# Patient Record
Sex: FEMALE | Race: WHITE | NOT HISPANIC OR LATINO | Employment: OTHER | URBAN - METROPOLITAN AREA
[De-identification: names, ages, dates, MRNs, and addresses within clinical notes are randomized per-mention and may not be internally consistent; named-entity substitution may affect disease eponyms.]

---

## 2017-03-01 ENCOUNTER — OFFICE VISIT (OUTPATIENT)
Dept: ORTHOPEDIC SURGERY | Facility: CLINIC | Age: 62
End: 2017-03-01

## 2017-03-01 VITALS — WEIGHT: 293 LBS | BODY MASS INDEX: 48.82 KG/M2 | HEIGHT: 65 IN

## 2017-03-01 DIAGNOSIS — M17.11 PRIMARY OSTEOARTHRITIS OF RIGHT KNEE: ICD-10-CM

## 2017-03-01 DIAGNOSIS — E66.9 OBESITY, UNSPECIFIED OBESITY SEVERITY, UNSPECIFIED OBESITY TYPE: ICD-10-CM

## 2017-03-01 DIAGNOSIS — M25.561 RIGHT KNEE PAIN, UNSPECIFIED CHRONICITY: Primary | ICD-10-CM

## 2017-03-01 PROCEDURE — 73560 X-RAY EXAM OF KNEE 1 OR 2: CPT | Performed by: ORTHOPAEDIC SURGERY

## 2017-03-01 PROCEDURE — 99213 OFFICE O/P EST LOW 20 MIN: CPT | Performed by: ORTHOPAEDIC SURGERY

## 2017-03-01 RX ORDER — MELOXICAM 7.5 MG/1
TABLET ORAL
Qty: 30 TABLET | Refills: 1 | Status: SHIPPED | OUTPATIENT
Start: 2017-03-01 | End: 2017-09-08 | Stop reason: SDUPTHER

## 2017-03-01 RX ORDER — BUMETANIDE 1 MG/1
1 TABLET ORAL DAILY
COMMUNITY
Start: 2017-02-17

## 2017-03-01 RX ORDER — PRAVASTATIN SODIUM 40 MG
40 TABLET ORAL NIGHTLY
COMMUNITY
Start: 2017-02-17

## 2017-03-01 NOTE — PROGRESS NOTES
Chief Complaint   Patient presents with   • Right Knee - Follow-up, Pain           HPI  Patient reports that she has pain from thigh to shin on right leg.  Patient is doing quite well from the prepatellar bursal infection which has resolved following an I&D that I had performed.  She now has medial sided joint line pain and tenderness.  There radiates down to the shin and makes it difficult for her to walk.  She has difficulty in walking on uneven surfaces.  Difficulty in going up and down the steps.  The knee pain is affecting her quality of life negatively.  She states that the knee pain is burning in character.  There is associated swelling of the knee which causes of tightness in flexion.        There were no vitals filed for this visit.        Review of Systems   Constitutional: Negative for chills, fever and unexpected weight change.   HENT: Negative for trouble swallowing and voice change.    Eyes: Negative for visual disturbance.   Respiratory: Negative for cough and shortness of breath.    Cardiovascular: Negative for chest pain and leg swelling.   Gastrointestinal: Negative for abdominal pain, nausea and vomiting.   Endocrine: Negative for cold intolerance and heat intolerance.   Genitourinary: Negative for difficulty urinating, frequency and urgency.   Musculoskeletal: Positive for arthralgias, joint swelling and myalgias.   Skin: Negative for rash and wound.   Allergic/Immunologic: Negative for immunocompromised state.   Neurological: Negative for weakness and numbness.   Hematological: Does not bruise/bleed easily.   Psychiatric/Behavioral: Negative for dysphoric mood. The patient is not nervous/anxious.            Physical Exam   Constitutional: She appears well-developed.   HENT:   Head: Normocephalic.   Eyes: Pupils are equal, round, and reactive to light.   Neck: Normal range of motion.   Cardiovascular: Normal rate and intact distal pulses.    Pulmonary/Chest: Effort normal and breath sounds  normal.   Abdominal: Soft. Bowel sounds are normal.   Musculoskeletal: Normal range of motion.   Neurological: She is alert. She has normal reflexes.   Skin: Skin is warm and dry.   Psychiatric: She has a normal mood and affect. Her behavior is normal. Judgment and thought content normal.           Joint/Body Part Specific Exam:  right knee. Patient has crepitus throughout range of motion. Positive patellar grind test. Mild effusion. Lachman is negative. Pivot shift is negative. Anterior and posterior drawer signs are negative. Significant joint line tenderness is noted on the medial aspect of the knee. Patient has a varus orientation of the knee. Range of motion in flexion is for 0- 100° degrees. Neurovascular status intact.  Dorsalis pedis and posterior tibial artery pulses are palpable. Common peroneal nerve function is well preserved. Patients gait is cautious and antalgic. Skin and soft tissues are swollen; consistent with synovitis and effusion      X-RAY Report:  right Knee X-Ray  Indication: Pain on the medial aspect the knee  AP, Lateral, and [place] views  Findings: Advanced degenerative osteoarthritis with complete loss of medial joint space  no bony lesion  Soft tissues within normal limits  decreased joint spaces  Hardware appropriately positioned not applicable      yes prior studies available for comparison.    X-RAY was ordered and reviewed by Wong Arrieta MD      Diagnostics:        Letha was seen today for follow-up and pain.    Diagnoses and all orders for this visit:    Right knee pain, unspecified chronicity  -     XR Knee 1 or 2 View Right            Procedures        Plan:  Gentle exercise program including stretching of the quads and the hamstrings.    Patient needs to aggressively lose weight because her high BMI is affecting knee function negatively.    Tablet Mobic 7.5 mg tab 1 by mouth daily for pain swelling and inflammation.    Uses supportive brace to the knee.    Calcium and vitamin  D for bone health.    Steroid injection with intra-articular application discussed with the patient but at this point she would like to defer that option.    Eventually she is going to need a total knee arthroplasty but I would like to wait for surgical intervention as long as possible.    Follow-up in 6 months.

## 2017-07-19 ENCOUNTER — TELEPHONE (OUTPATIENT)
Dept: ORTHOPEDIC SURGERY | Facility: CLINIC | Age: 62
End: 2017-07-19

## 2017-07-19 NOTE — TELEPHONE ENCOUNTER
PATIENT CALLED AND IS ASKING IF DR. BAINS WILL PRESCRIBE SOMETHING FOR PAIN.  PATIENT STATED THAT HER RIGHT KNEE IS REALLY PAINFUL WHERE THE KNOT IS ON HER KNEE.  PATIENT DESCRIBES THE KNOT TO BE ABOUT THE SIZE OF A BOILED EGG CUT LENGTHWISE.  PATIENT BELIEVES THE KNOT TO BE SCAR TISSUE.  SHE HAS TRIED ADVIL AND THAT ONLY WORKED FOR A LITTLE WHILE.  I HAVE SCHEDULED PATIENT AN APPOINTMENT FOR 8/02/17 TO SEE DR. BAINS.  PATIENT IS S/P RIGHT KNEE I & D ON 8/12/16.  PATIENT IS ALLERGIC TO ERYTHROMYCIN.  PLEASE ADVISE

## 2017-07-20 NOTE — TELEPHONE ENCOUNTER
Called into pharmacy Tramadol 50mg #30 one tablet every 12hrs. Prn pain no refills per [facility]. I called the patient to let her know/rj.

## 2017-08-02 ENCOUNTER — OFFICE VISIT (OUTPATIENT)
Dept: ORTHOPEDIC SURGERY | Facility: CLINIC | Age: 62
End: 2017-08-02

## 2017-08-02 VITALS — TEMPERATURE: 98.7 F | HEIGHT: 65 IN | WEIGHT: 293 LBS | BODY MASS INDEX: 48.82 KG/M2

## 2017-08-02 DIAGNOSIS — M17.11 PRIMARY OSTEOARTHRITIS OF RIGHT KNEE: Primary | ICD-10-CM

## 2017-08-02 PROCEDURE — 99213 OFFICE O/P EST LOW 20 MIN: CPT | Performed by: ORTHOPAEDIC SURGERY

## 2017-08-02 PROCEDURE — 20610 DRAIN/INJ JOINT/BURSA W/O US: CPT | Performed by: ORTHOPAEDIC SURGERY

## 2017-08-02 RX ORDER — TRAMADOL HYDROCHLORIDE 50 MG/1
TABLET ORAL
Refills: 0 | COMMUNITY
Start: 2017-07-20

## 2017-08-02 RX ORDER — VENLAFAXINE HYDROCHLORIDE 37.5 MG/1
CAPSULE, EXTENDED RELEASE ORAL
COMMUNITY
Start: 2017-07-06 | End: 2017-08-02

## 2017-08-02 RX ORDER — VENLAFAXINE HYDROCHLORIDE 75 MG/1
75 CAPSULE, EXTENDED RELEASE ORAL DAILY
COMMUNITY
Start: 2017-07-06 | End: 2019-09-26

## 2017-08-02 RX ORDER — EMPAGLIFLOZIN 10 MG/1
TABLET, FILM COATED ORAL
Refills: 2 | COMMUNITY
Start: 2017-07-18

## 2017-08-02 RX ORDER — INSULIN GLARGINE 100 [IU]/ML
INJECTION, SOLUTION SUBCUTANEOUS
Refills: 2 | COMMUNITY
Start: 2017-07-24

## 2017-08-02 RX ORDER — ERGOCALCIFEROL 1.25 MG/1
50000 CAPSULE ORAL
Refills: 2 | COMMUNITY
Start: 2017-06-14

## 2017-08-02 RX ADMIN — METHYLPREDNISOLONE ACETATE 160 MG: 80 INJECTION, SUSPENSION INTRA-ARTICULAR; INTRALESIONAL; INTRAMUSCULAR; SOFT TISSUE at 16:19

## 2017-08-02 RX ADMIN — LIDOCAINE HYDROCHLORIDE 2 ML: 10 INJECTION, SOLUTION INFILTRATION; PERINEURAL at 16:19

## 2017-08-02 NOTE — PROGRESS NOTES
Chief Complaint   Patient presents with   • Right Knee - Follow-up, Pain           HPI patient here for follow up on right kneePain and discomfort.  The pain is on the medial aspect the knee.  His symptoms are worse and she fell [facility].  She did develop a prepatellar hematoma which had to be eventually evacuated the surgical intervention.  The knee tends to buckle and give out from underneath her.  She has difficulty in going up and down the steps.  Cross body activities bother the patient significantly.  She finds it very difficult to walk on an inclined surface.  She does get recurrent effusions and when the knee is swollen she has a very difficult time with squatting on the ground.  Occasionally the knee will buckle when she is not ready for it and she is concerned that she'll fall and hurt either the upper extremities or her hip or low back.  Patient does understand that she is very significantly obese and needs to work hard to reduce her body weight so that she can get an optimal outcome with conservative versus operative care of the knee.        Vitals:    08/02/17 1541   Temp: 98.7 °F (37.1 °C)           Review of Systems   Constitutional: Negative.    HENT: Negative.    Eyes: Negative.    Respiratory: Negative.    Cardiovascular: Negative.    Gastrointestinal: Negative.    Endocrine: Negative.    Genitourinary: Negative.    Musculoskeletal: Negative.    Skin: Negative.    Allergic/Immunologic: Negative.    Neurological: Negative.    Hematological: Negative.    Psychiatric/Behavioral: Negative.            Physical Exam   Constitutional: She is oriented to person, place, and time. She appears well-nourished.   HENT:   Head: Atraumatic.   Eyes: EOM are normal.   Neck: Neck supple.   Cardiovascular: Normal rate and intact distal pulses.    Pulmonary/Chest: Effort normal and breath sounds normal.   Abdominal: Soft. Bowel sounds are normal.   Musculoskeletal: She exhibits edema, tenderness and  deformity.   Neurological: She is alert and oriented to person, place, and time. She has normal reflexes.   Skin: Skin is dry.   Psychiatric: She has a normal mood and affect. Her behavior is normal. Judgment and thought content normal.   Nursing note and vitals reviewed.          Joint/Body Part Specific Exam:  right knee. Patient has crepitus throughout range of motion. Positive patellar grind test. Mild effusion. Lachman is negative. Pivot shift is negative. Anterior and posterior drawer signs are negative. Significant joint line tenderness is noted on the medial aspect of the knee. Patient has a varus orientation of the knee. Range of motion in flexion is for 0- 110° degrees. Neurovascular status intact.  Dorsalis pedis and posterior tibial artery pulses are palpable. Common peroneal nerve function is well preserved. Patients gait is cautious and antalgic. Skin and soft tissues are swollen; consistent with synovitis and effusion      X-RAY Report:  Previously obtained x-rays are reviewed and there is some narrowing of the medial joint space      Diagnostics:        Letha was seen today for follow-up and pain.    Diagnoses and all orders for this visit:    Primary osteoarthritis of right knee  -     Large Joint Arthrocentesis          Large Joint Arthrocentesis  Date/Time: 8/2/2017 4:19 PM  Consent given by: patient  Site marked: site marked  Timeout: Immediately prior to procedure a time out was called to verify the correct patient, procedure, equipment, support staff and site/side marked as required   Supporting Documentation  Indications: pain   Procedure Details  Location: knee - R knee  Preparation: Patient was prepped and draped in the usual sterile fashion  Needle size: 25 G  Approach: anteromedial  Medications administered: 2 mL lidocaine 1 %; 160 mg methylPREDNISolone acetate 80 MG/ML  Patient tolerance: patient tolerated the procedure well with no immediate complications              Plan:  Injected  patient's right knee with a steroid from an anteromedial approach.    Use a supportive brace to the knee.    Calcium and vitamin D for bone health.    Tablet ibuprofen 600 mg orally twice a day when necessary pain and discomfort.    Eventually this patient will need a total knee arthroplasty but she has many medical issues including poorly controlled diabetes and therefore I would recommend conservative, nonoperative care for as long as possible.    Follow-up in my office in 3 months.    Intra-articular Synvisc Visco supplementation discussed with the patient and she understands and accepts that.  Her graft patient weighs 299 pounds by her own admission and at this point her BMI is extremely high.  She needs to work aggressively with a dietitian as well as with a bariatric surgeon to help decrease her BMI and to minimize her mechanical symptoms from overloading of the lower extremities because of her obesity.  After for a surgical candidate but minimal risks she needs to be in the vicinity of 200 pounds rather than the current almost 300 pounds.

## 2017-08-10 RX ORDER — METHYLPREDNISOLONE ACETATE 80 MG/ML
160 INJECTION, SUSPENSION INTRA-ARTICULAR; INTRALESIONAL; INTRAMUSCULAR; SOFT TISSUE
Status: COMPLETED | OUTPATIENT
Start: 2017-08-02 | End: 2017-08-02

## 2017-08-10 RX ORDER — LIDOCAINE HYDROCHLORIDE 10 MG/ML
2 INJECTION, SOLUTION INFILTRATION; PERINEURAL
Status: COMPLETED | OUTPATIENT
Start: 2017-08-02 | End: 2017-08-02

## 2017-09-05 ENCOUNTER — TELEPHONE (OUTPATIENT)
Dept: ORTHOPEDIC SURGERY | Facility: CLINIC | Age: 62
End: 2017-09-05

## 2017-09-05 NOTE — TELEPHONE ENCOUNTER
PATIENT CALLED AND STATED THAT TRAMADOL ISN'T HELPING WITH THE PAIN IN HER KNEE.  IS THERE ANYTHING ELSE THAT DR. BAINS CAN PRESCRIBE?

## 2017-09-05 NOTE — TELEPHONE ENCOUNTER
Patient will have to call her PCP or get attached to a pain clinic if she is wanting to be prescribed a narcotic medication for her knee pain.

## 2017-09-08 RX ORDER — MELOXICAM 7.5 MG/1
TABLET ORAL
Qty: 30 TABLET | Refills: 1 | Status: SHIPPED | OUTPATIENT
Start: 2017-09-08

## 2017-11-02 ENCOUNTER — CLINICAL SUPPORT (OUTPATIENT)
Dept: ORTHOPEDIC SURGERY | Facility: CLINIC | Age: 62
End: 2017-11-02

## 2017-11-02 ENCOUNTER — OFFICE VISIT (OUTPATIENT)
Dept: ORTHOPEDIC SURGERY | Facility: CLINIC | Age: 62
End: 2017-11-02

## 2017-11-02 DIAGNOSIS — M17.12 PRIMARY OSTEOARTHRITIS OF LEFT KNEE: ICD-10-CM

## 2017-11-02 DIAGNOSIS — M70.62 GREATER TROCHANTERIC BURSITIS OF LEFT HIP: ICD-10-CM

## 2017-11-02 DIAGNOSIS — IMO0001 CLASS 3 OBESITY WITHOUT SERIOUS COMORBIDITY WITH BODY MASS INDEX (BMI) OF 45.0 TO 49.9 IN ADULT, UNSPECIFIED OBESITY TYPE: ICD-10-CM

## 2017-11-02 DIAGNOSIS — M70.42 PREPATELLAR BURSITIS OF LEFT KNEE: ICD-10-CM

## 2017-11-02 DIAGNOSIS — M17.11 PRIMARY OSTEOARTHRITIS OF RIGHT KNEE: ICD-10-CM

## 2017-11-02 DIAGNOSIS — M17.11 PRIMARY OSTEOARTHRITIS OF RIGHT KNEE: Primary | ICD-10-CM

## 2017-11-02 DIAGNOSIS — M17.12 PRIMARY OSTEOARTHRITIS OF LEFT KNEE: Primary | ICD-10-CM

## 2017-11-02 PROCEDURE — 20610 DRAIN/INJ JOINT/BURSA W/O US: CPT | Performed by: ORTHOPAEDIC SURGERY

## 2017-11-02 PROCEDURE — 99213 OFFICE O/P EST LOW 20 MIN: CPT | Performed by: ORTHOPAEDIC SURGERY

## 2017-11-02 PROCEDURE — 73502 X-RAY EXAM HIP UNI 2-3 VIEWS: CPT | Performed by: ORTHOPAEDIC SURGERY

## 2017-11-02 RX ADMIN — LIDOCAINE HYDROCHLORIDE 2 ML: 10 INJECTION, SOLUTION INFILTRATION; PERINEURAL at 10:30

## 2017-11-02 RX ADMIN — METHYLPREDNISOLONE ACETATE 160 MG: 80 INJECTION, SUSPENSION INTRA-ARTICULAR; INTRALESIONAL; INTRAMUSCULAR; SOFT TISSUE at 10:30

## 2017-11-02 NOTE — PROGRESS NOTES
Letha Almonte  ?  1955  ?  Chief Complaint   Patient presents with   • Right Knee - Follow-up     ?  HPI the patient is here today for a follow up of her right knee. She has pain and discomfort medially. Occasionally the knee will buckle and give out from underneath her.  She has difficulty going up and down the steps.  Walking on inclined surfaces bothers the patient significantly.  I have counseled her to wait for surgical intervention in the form of a total knee arthroplasty for as long as possible because she is relatively young and her radiological findings are not as advanced .  She also had an infected prepatellar bursitis last year and I am trying to maximize the time between the infective pathology and an elective knee replacement     Physical Exam   Constitutional: She is oriented to person, place, and time. She appears well-nourished.   HENT:   Head: Atraumatic.   Eyes: EOM are normal.   Neck: Neck supple.   Cardiovascular: Normal rate and intact distal pulses.    Pulmonary/Chest: Effort normal and breath sounds normal.   Abdominal: Bowel sounds are normal.   Musculoskeletal: She exhibits edema and tenderness.   Neurological: She is alert and oriented to person, place, and time. She has normal reflexes.   Skin: Skin is dry.   Psychiatric: She has a normal mood and affect. Her behavior is normal. Judgment and thought content normal.   Nursing note and vitals reviewed.      Review of Systems   Constitutional: Negative.    HENT: Negative.    Eyes: Negative.    Respiratory: Negative.    Cardiovascular: Negative.    Gastrointestinal: Negative.    Endocrine: Negative.    Genitourinary: Negative.    Musculoskeletal: Positive for back pain, gait problem and joint swelling.   Skin: Negative.    Allergic/Immunologic: Negative.    Hematological: Negative.    Psychiatric/Behavioral: Negative.        Joint/Body Part Specific Exam:   right knee. Patient has crepitus throughout range of motion. Positive patellar grind  test. Mild effusion. Lachman is negative. Pivot shift is negative. Anterior and posterior drawer signs are negative. Significant joint line tenderness is noted on the medial aspect of the knee. Patient has a varus orientation of the knee. Range of motion in flexion is for 0- 100 degrees. Neurovascular status intact.  Dorsalis pedis and posterior tibial artery pulses are palpable. Common peroneal nerve function is well preserved. Patients gait is cautious and antalgic. Skin and soft tissues are swollen; consistent with synovitis and effusion  X-Ray Report:  right Knee X-Ray  Indication: Pain on going up the stairs  AP, Lateral views  Findings: moderately advanced medial compartment disease  no bony lesion  Soft tissues within normal limits  decreased joint spaces  Hardware appropriately positioned not applicable      yes prior studies available for comparison.    X-RAY was ordered and reviewed by Wong Arrieta MD  Diagnostics:    Large Joint Arthrocentesis  Date/Time: 11/2/2017 10:30 AM  Consent given by: patient  Site marked: site marked  Timeout: Immediately prior to procedure a time out was called to verify the correct patient, procedure, equipment, support staff and site/side marked as required   Supporting Documentation  Indications: pain   Procedure Details  Location: knee - R knee  Preparation: Patient was prepped and draped in the usual sterile fashion  Needle size: 25 G  Approach: anteromedial  Medications administered: 2 mL lidocaine 1 %; 160 mg methylPREDNISolone acetate 80 MG/ML  Patient tolerance: patient tolerated the procedure well with no immediate complications        ?  ?  Plan      · Ice pack for 48 hrs     · Injected patients right knee joint(s) with Steroid    · Ace wrap for swelling PRN    · Elevation for swelling PRN    · Tablet Ibuprofen 600 mg P.O. BID x 5 Days with meals    · Call office for any problems  With procedure    · Home Physical Therapy Program discussed with patient    · F/U in 3  months for Re-evaluation

## 2017-11-02 NOTE — PROGRESS NOTES
Chief Complaint   Patient presents with   • Left Hip - Pain             HPI The patient is here today for left hip pain and discomfort. This pain has been ongoing for about 1 month. The pain is over the lateral aspect of the hip.  It is a sharp stabbing pain.  It is based over the greater trochanter.  Cross body activities bother the patient significantly.  She has difficulty in turning over in bed at night.  She does have bilateral knee pain with extension and this makes her hip pain a whole lot worse.  She does not have any risk factors for avascular necrosis.          Allergies   Allergen Reactions   • Erythromycin          Social History     Social History   • Marital status: Single     Spouse name: N/A   • Number of children: N/A   • Years of education: N/A     Occupational History   • Not on file.     Social History Main Topics   • Smoking status: Never Smoker   • Smokeless tobacco: Never Used   • Alcohol use No   • Drug use: No   • Sexual activity: Defer     Other Topics Concern   • Not on file     Social History Narrative       Family History   Problem Relation Age of Onset   • Hypertension Other    • Diabetes Other    • Cancer Other      colon cancer       Past Surgical History:   Procedure Laterality Date   • CHOLECYSTECTOMY     • HEEL SPUR SURGERY     • HERNIA REPAIR      hiatal hernia   • HYSTERECTOMY     • ROTATOR CUFF REPAIR         Past Medical History:   Diagnosis Date   • Anxiety    • Depression    • Diabetes mellitus    • Hypertension    • Kidney stones    • Osteoarthritis    • Sleep apnea            There were no vitals filed for this visit.          Review of Systems   Constitutional: Negative.    HENT: Negative.    Eyes: Negative.    Respiratory: Negative.    Cardiovascular: Negative.    Endocrine: Negative.    Genitourinary: Negative.    Musculoskeletal: Positive for gait problem and joint swelling.   Skin: Negative.    Allergic/Immunologic: Negative.    Hematological: Negative.     Psychiatric/Behavioral: Negative.            Physical Exam   Constitutional: She is oriented to person, place, and time. She appears well-nourished.   HENT:   Head: Atraumatic.   Eyes: EOM are normal.   Neck: Neck supple.   Cardiovascular: Normal rate and intact distal pulses.    Pulmonary/Chest: Breath sounds normal.   Abdominal: Bowel sounds are normal.   Musculoskeletal: She exhibits edema and tenderness. She exhibits no deformity.   Neurological: She is alert and oriented to person, place, and time. She has normal reflexes.   Skin: Skin is dry.   Psychiatric: She has a normal mood and affect. Her behavior is normal. Judgment and thought content normal.   Nursing note and vitals reviewed.              Joint/Body Part Specific Exam:  left Hip. Skin and soft tissues over the greater trochanteric bursa are painful and tender for the patient. Neurovascular status is intact. IT band is painful and tender. Cross body adduction bothers the patient significantly. Internal and external rotations bother the patient significantly with tenderness over the greater trochanter. There is no clinical deformity. No shortening. The patient does have a significant limp because by the trochanteric pain caused by the abductors. The piriformis is tight and with any rotation there is significant capsular tightness. Dorsalis pedis and posterior tibial artery pulses are palpable. Capillary refill is 2 seconds with a brisk return. Common peroneal nerve function is well preserved.          X-RAY Report:  X-ray films of the hip from an outside facility show some narrowing of the lateral joint space.          Diagnostics:            Letha was seen today for pain.    Diagnoses and all orders for this visit:    Primary osteoarthritis of left knee    Primary osteoarthritis of right knee    Class 3 obesity without serious comorbidity with body mass index (BMI) of 45.0 to 49.9 in adult, unspecified obesity type    Greater trochanteric bursitis  of left hip            Procedures          I provided this patient with educational materials regarding bone health.        Plan:   Stretching exercises of the hip including the IT band.    Tablet ibuprofen 600 mg orally twice a day for pain and discomfort.    Patient needs to work hard with a multidisciplinary team to attempt to lose some body weight because her obesity is hurting his lower extremity symptoms are very significantly.    Calcium and vitamin D for bone health.    Steroid injection to the base of the greater trochanter over the bursa are discussed with the patient but she declines at today's visit.    Follow-up in my office in 6 months.          CC To Ty Fernandez MD

## 2017-11-12 RX ORDER — METHYLPREDNISOLONE ACETATE 80 MG/ML
160 INJECTION, SUSPENSION INTRA-ARTICULAR; INTRALESIONAL; INTRAMUSCULAR; SOFT TISSUE
Status: COMPLETED | OUTPATIENT
Start: 2017-11-02 | End: 2017-11-02

## 2017-11-12 RX ORDER — LIDOCAINE HYDROCHLORIDE 10 MG/ML
2 INJECTION, SOLUTION INFILTRATION; PERINEURAL
Status: COMPLETED | OUTPATIENT
Start: 2017-11-02 | End: 2017-11-02

## 2018-02-08 ENCOUNTER — OFFICE VISIT (OUTPATIENT)
Dept: ORTHOPEDIC SURGERY | Facility: CLINIC | Age: 63
End: 2018-02-08

## 2018-02-08 DIAGNOSIS — M17.11 PRIMARY OSTEOARTHRITIS OF RIGHT KNEE: ICD-10-CM

## 2018-02-08 DIAGNOSIS — G89.29 CHRONIC PAIN OF RIGHT KNEE: ICD-10-CM

## 2018-02-08 DIAGNOSIS — M25.561 CHRONIC PAIN OF RIGHT KNEE: ICD-10-CM

## 2018-02-08 DIAGNOSIS — M25.552 LEFT HIP PAIN: Primary | ICD-10-CM

## 2018-02-08 PROCEDURE — 20610 DRAIN/INJ JOINT/BURSA W/O US: CPT | Performed by: ORTHOPAEDIC SURGERY

## 2018-02-08 PROCEDURE — 73502 X-RAY EXAM HIP UNI 2-3 VIEWS: CPT | Performed by: ORTHOPAEDIC SURGERY

## 2018-02-08 PROCEDURE — 99213 OFFICE O/P EST LOW 20 MIN: CPT | Performed by: ORTHOPAEDIC SURGERY

## 2018-02-08 RX ADMIN — METHYLPREDNISOLONE ACETATE 160 MG: 80 INJECTION, SUSPENSION INTRA-ARTICULAR; INTRALESIONAL; INTRAMUSCULAR; SOFT TISSUE at 13:30

## 2018-02-08 RX ADMIN — LIDOCAINE HYDROCHLORIDE 2 ML: 10 INJECTION, SOLUTION EPIDURAL; INFILTRATION; INTRACAUDAL; PERINEURAL at 13:30

## 2018-02-08 NOTE — PROGRESS NOTES
Chief Complaint   Patient presents with   • Left Hip - Pain   • Right Knee - Follow-up   • Injections     LEFT HIP INJECTION   Patient is here today complaining of left hip pain. She would also like a right knee injection.      HPI patient has pain and discomfort in her right knee.  The pain is over the medial aspect of the knee.  She has difficulty going up and down the steps.  She states that her arthritis is slowly and progressively getting worse.  She is also complaining of left hip pain.  This pain is centered over the greater trochanteric bursa of the hip.  She has difficulty in turning over in bed at night.  We have discussed about referral to the pain clinic for this patient.  The patient does not have any risk factors for avascular necrosis.  We have discussed about eventually hip replacement but that should be a last resort.  I will also discussed with the patient that she needs to seriously attempt to lose some weight.  Her obesity is affecting both her low back pain as well as her hip pain in a negative fashion.        There were no vitals filed for this visit.        Review of Systems   Constitutional: Negative.    HENT: Negative.    Eyes: Negative.    Respiratory: Negative.    Cardiovascular: Negative.    Gastrointestinal: Negative.    Endocrine: Negative.    Genitourinary: Negative.    Musculoskeletal: Positive for back pain and gait problem.   Skin: Negative.    Allergic/Immunologic: Negative.    Hematological: Negative.    Psychiatric/Behavioral: Negative.            Physical Exam   Constitutional: She is oriented to person, place, and time. She appears well-developed and well-nourished.   HENT:   Head: Atraumatic.   Eyes: EOM are normal.   Neck: Neck supple.   Cardiovascular: Normal rate and regular rhythm.    Pulmonary/Chest: Effort normal and breath sounds normal.   Abdominal: Bowel sounds are normal.   Musculoskeletal: She exhibits edema, tenderness and deformity.   Neurological: She is alert  and oriented to person, place, and time. She has normal reflexes.   Skin: Skin is dry.   Psychiatric: She has a normal mood and affect. Her behavior is normal. Judgment and thought content normal.   Nursing note reviewed.          Joint/Body Part Specific Exam:  right knee. Patient has crepitus throughout range of motion. Positive patellar grind test. Mild effusion. Lachman is negative. Pivot shift is negative. Anterior and posterior drawer signs are negative. Significant joint line tenderness is noted on the medial aspect of the knee. Patient has a varus orientation of the knee. There is fullness and tenderness in the Popliteal fossa. Mild distention of a Popliteal cyst is noted in this location. Range of motion in flexion is from 0- 110° degrees. Neurovascular status is intact.  Dorsalis pedis and posterior tibial artery pulses are palpable. Common peroneal nerve function is well preserved. Patient's gait is cautious and antalgic. Skin and soft tissues are mildly swollen, consistent with synovitis and effusion. The patient has a significant limp with the first few steps after starting the gait cycle. Getting out of a chair takes a lot of effort due to pain on knee flexion.    left hip. Neurovascular status is intact. Patient has a distant limp on the affected side. Internal and external rotations are associated with pain and discomfort. Anterior joint line pain and tenderness is significant. Stinchfield sign is positive. Figure of 4 sign is positive. Patient is unable to perform an active straight leg raise exam. Greater trochanter is tender. Crossover adduction test is positive. Cross body adduction is limited and painful for the patient. Patient has very significant limp and joint line tenderness anteriorly, posteriorly and medially. Dorsalis pedis and posterior tibial artery pulses are palpable. Common peroneal nerve function is well preserved.    X-RAY Report:  Pelvis X-Ray  Indication: Bilateral hip pain left  worse than right  AP and Frogleg views  Findings: Moderately advanced degenerative osteoarthritis of the left hip  no bony lesion  Soft tissues within normal limits  decreased joint spaces  Hardware appropriately positioned not applicable      no prior studies available for comparison.    X-RAY was ordered and reviewed by Wong Arrieta MD          Diagnostics:        Letha was seen today for injections, pain and follow-up.    Diagnoses and all orders for this visit:    Left hip pain  -     XR Hip With or Without Pelvis 2 - 3 View Left            Large Joint Arthrocentesis  Date/Time: 2/8/2018 1:30 PM  Consent given by: patient  Site marked: site marked  Timeout: Immediately prior to procedure a time out was called to verify the correct patient, procedure, equipment, support staff and site/side marked as required   Supporting Documentation  Indications: pain   Procedure Details  Location: knee - R knee  Preparation: Patient was prepped and draped in the usual sterile fashion  Needle size: 25 G  Approach: anteromedial  Medications administered: 160 mg methylPREDNISolone acetate 80 MG/ML; 2 mL lidocaine PF 1% 1 %  Patient tolerance: patient tolerated the procedure well with no immediate complications              Plan:  Injected patient's right knee with a steroid from an anteromedial approach.    Stretching and strengthening exercises of the IT band.    Use a supportive brace on the right knee.    My recommendations are that she should be seen by the pain clinic for her chronic pain management and need for narcotic pain pills to manage her symptoms.    Tablet mobic  7.5 mg tab 1 by mouth daily at bedtime for pain swelling and discomfort.    Eventually she might need a total knee replacement on the right side.    The hip disease is not advanced enough at this point to consider surgical intervention.    Follow-up in 3 months.

## 2018-02-22 PROBLEM — M25.561 CHRONIC PAIN OF RIGHT KNEE: Status: ACTIVE | Noted: 2018-02-22

## 2018-02-22 PROBLEM — M25.552 LEFT HIP PAIN: Status: ACTIVE | Noted: 2018-02-22

## 2018-02-22 PROBLEM — G89.29 CHRONIC PAIN OF RIGHT KNEE: Status: ACTIVE | Noted: 2018-02-22

## 2018-02-22 RX ORDER — LIDOCAINE HYDROCHLORIDE 10 MG/ML
2 INJECTION, SOLUTION EPIDURAL; INFILTRATION; INTRACAUDAL; PERINEURAL
Status: COMPLETED | OUTPATIENT
Start: 2018-02-08 | End: 2018-02-08

## 2018-02-22 RX ORDER — METHYLPREDNISOLONE ACETATE 80 MG/ML
160 INJECTION, SUSPENSION INTRA-ARTICULAR; INTRALESIONAL; INTRAMUSCULAR; SOFT TISSUE
Status: COMPLETED | OUTPATIENT
Start: 2018-02-08 | End: 2018-02-08

## 2018-05-17 ENCOUNTER — CLINICAL SUPPORT (OUTPATIENT)
Dept: ORTHOPEDIC SURGERY | Facility: CLINIC | Age: 63
End: 2018-05-17

## 2018-05-17 VITALS — BODY MASS INDEX: 48.82 KG/M2 | HEIGHT: 65 IN | WEIGHT: 293 LBS

## 2018-05-17 DIAGNOSIS — M17.11 PRIMARY OSTEOARTHRITIS OF RIGHT KNEE: Primary | ICD-10-CM

## 2018-05-17 DIAGNOSIS — M17.12 PRIMARY OSTEOARTHRITIS OF LEFT KNEE: ICD-10-CM

## 2018-05-17 PROCEDURE — 99213 OFFICE O/P EST LOW 20 MIN: CPT | Performed by: ORTHOPAEDIC SURGERY

## 2018-05-17 PROCEDURE — 73560 X-RAY EXAM OF KNEE 1 OR 2: CPT | Performed by: ORTHOPAEDIC SURGERY

## 2018-11-01 ENCOUNTER — OFFICE VISIT (OUTPATIENT)
Dept: ORTHOPEDIC SURGERY | Facility: CLINIC | Age: 63
End: 2018-11-01

## 2018-11-01 DIAGNOSIS — M17.11 PRIMARY OSTEOARTHRITIS OF RIGHT KNEE: Primary | ICD-10-CM

## 2018-11-01 PROCEDURE — 99213 OFFICE O/P EST LOW 20 MIN: CPT | Performed by: ORTHOPAEDIC SURGERY

## 2018-11-01 NOTE — PROGRESS NOTES
Chief Complaint   Patient presents with   • Right Knee - Follow-up           HPI  Patient returns for a follow up of her right knee.  The patient states that she has pain and discomfort on the medial aspect of the knee.  Occasionally the knee will buckle and give out from underneath her.  She has difficulty climbing up and down the steps and is therefore requesting a letter to be provided with an apartment on the first floor.  The patient states that she has a lot of pain and discomfort in the prepatellar bursa.  She's had a resection of this area because she had developed an infection in this location several months ago.  She has recovered from that infection but continues to have pain and discomfort related to the scar tissue from the surgical resection.  She is not a candidate for surgical replacement of the knee at this point because he has a very high BMI and the risk of complications would be exceedingly high and unacceptable.         There were no vitals filed for this visit.        Review of Systems   Constitutional: Negative.    HENT: Negative.    Eyes: Negative.    Respiratory: Negative.    Cardiovascular: Negative.    Gastrointestinal: Negative.    Endocrine: Negative.    Genitourinary: Negative.    Musculoskeletal: Positive for gait problem and joint swelling.   Skin: Negative.    Allergic/Immunologic: Negative.    Hematological: Negative.    Psychiatric/Behavioral: Negative.            Physical Exam   Constitutional: She is oriented to person, place, and time. She appears well-nourished.   HENT:   Head: Atraumatic.   Eyes: EOM are normal.   Neck: Neck supple.   Cardiovascular: Normal heart sounds.   Pulmonary/Chest: Breath sounds normal.   Abdominal: Bowel sounds are normal.   Musculoskeletal: She exhibits edema and tenderness.   Neurological: She is alert and oriented to person, place, and time.   Skin: Capillary refill takes 2 to 3 seconds.   Psychiatric: She has a normal mood and affect. Her behavior  is normal. Judgment and thought content normal.   Nursing note and vitals reviewed.          Joint/Body Part Specific Exam:  right knee. Patient has crepitus throughout range of motion. Positive patellar grind test. Mild effusion. Lachman is negative. Pivot shift is negative. Anterior and posterior drawer signs are negative. Significant joint line tenderness is noted on the medial aspect of the knee. Patient has a varus orientation of the knee. There is fullness and tenderness in the Popliteal fossa. Mild distention of a Popliteal cyst is noted in this location. Range of motion in flexion is from 0- 90 degrees. Neurovascular status is intact.  Dorsalis pedis and posterior tibial artery pulses are palpable. Common peroneal nerve function is well preserved. Patient's gait is cautious and antalgic. Skin and soft tissues are mildly swollen, consistent with synovitis and effusion. The patient has a significant limp with the first few steps after starting the gait cycle. Getting out of a chair takes a lot of effort due to pain on knee flexion.  There is a previously healed incision from a resection of her prepatellar bursa present over the anterior aspect of the knee      X-RAY Report:        Diagnostics:        Letha was seen today for follow-up.    Diagnoses and all orders for this visit:    Primary osteoarthritis of right knee  -     Ambulatory Referral to Physical Therapy Evaluate and treat            Procedures        Plan: Uses supportive brace on the knee to prevent it from buckling and giving out.    Tablet ibuprofen 600 mg orally twice a day for pain swelling and discomfort.    Tablet mobic 7.5 mg tab 1 by mouth daily at bedtime for breakthrough pain.    Intra-articular Synvisc Visco supplementation discussed with the patient.    As far as her living conditions are concerned I would recommend that she be provided with a first floor apartment where she does not have to climb up and down the steps because she is  fearful of falling due to the instability and pain in her knee.    She needs to significantly lose a lot of weight because her BMI is 49.8 and that is affecting her quality of life in a negative fashion.  The patient needs to seriously think about calorie counts, controlling portion size, regular exercises, carbohydrate control, consultation with a dietitian and eventually a bariatric surgery consultation.    Follow-up in my office in 6 months.

## 2019-01-03 ENCOUNTER — TELEPHONE (OUTPATIENT)
Dept: ORTHOPEDIC SURGERY | Facility: CLINIC | Age: 64
End: 2019-01-03

## 2019-01-03 NOTE — TELEPHONE ENCOUNTER
JUMANA WITH PT CALLED TO LET YOU KNOW THAT MS. RAMOS HAS BEEN TREATING WITH THEM FOR A MONTH NOW WITH NO PROGRESS AND SHE STATES THAT THE PATIENT SAYS THAT PT IS HURTING HER KNEE. PLEASE ADVISE. JUMANA CAN BE REACHED -[phone]

## 2019-01-05 NOTE — TELEPHONE ENCOUNTER
We need to continue to treat this patient nonoperatively.  She is not a candidate for knee replacement surgery.  She does need to lose body weight because the morbid obesity is definitely affecting her knee function in a negative fashion.  Use a brace.  Anti-inflammatory medication.  Intra-articular steroid injection is an option and we can set her up to have an appointment for that procedure.

## 2019-01-18 NOTE — TELEPHONE ENCOUNTER
PATIENT CALLED BACK IN AND WOULD LIKE TO GET A BRACE. WHAT BRACE WOULD YOU RECOMMEND FOR HER. WOULD YOU LIKE ME TO GET THAOS TO GET THAT FOR HER?

## 2019-05-02 ENCOUNTER — OFFICE VISIT (OUTPATIENT)
Dept: ORTHOPEDIC SURGERY | Facility: CLINIC | Age: 64
End: 2019-05-02

## 2019-05-02 VITALS — HEIGHT: 64 IN | WEIGHT: 293 LBS | BODY MASS INDEX: 50.02 KG/M2

## 2019-05-02 DIAGNOSIS — M17.11 PRIMARY OSTEOARTHRITIS OF RIGHT KNEE: Primary | ICD-10-CM

## 2019-05-02 DIAGNOSIS — M17.12 PRIMARY OSTEOARTHRITIS OF LEFT KNEE: ICD-10-CM

## 2019-05-02 DIAGNOSIS — E66.01 MORBIDLY OBESE (HCC): ICD-10-CM

## 2019-05-02 PROCEDURE — 99213 OFFICE O/P EST LOW 20 MIN: CPT | Performed by: ORTHOPAEDIC SURGERY

## 2019-05-02 NOTE — PROGRESS NOTES
NEW/FOLLOW UP VISIT    Patient: Letha Almonte  ?  YOB: 1955  ?  Chief Complaint   Patient presents with   • Right Knee - Follow-up      ?  HPI: Patient has been complaining of pain and discomfort on the medial aspect of both her knees.  The right side is more symptomatic than the left.  She has difficulty going up and down the steps.  She uses a brace which is helpful in managing her symptoms.  She complains of a dull aching sensation with every step she takes.  She has had a prepatellar bursal infection which was drained several years ago.  There has been no recurrence of the infection.  I have discussed with the patient that with a BMI of 50, her knee symptoms will continue to bother her and she is really not a candidate for surgical intervention because of a very high possibility of infective and other pathology perioperatively.  Pain Location: Medial aspect of both knees.  Radiation: none  Quality: dull and aching  Intensity/Severity: moderate  Duration: 2 to 3 years.  Onset quality: gradual   Timing: intermittent  Aggravating Factors: Going up and down the steps and squatting on the ground  Alleviating Factors: Eating the knee and using a brace.  Previous Episodes: none mentioned  Associated Symptoms: pain, swelling, decreased ROM  Previous Treatment: Anti-inflammatory medication and the use of a brace.    This patient is an established patient.  This problem is not new to this examiner.      Allergies:   Allergies   Allergen Reactions   • Erythromycin        Medications:   Home Medications:  Current Outpatient Medications on File Prior to Visit   Medication Sig   • amLODIPine (NORVASC) 10 MG tablet Take 10 mg by mouth Daily.   • bumetanide (BUMEX) 1 MG tablet Take 1 mg by mouth Daily.   • clonazePAM (KlonoPIN) 0.5 MG tablet Take 0.5 mg by mouth At Night As Needed.   • Elastic Bandages & Supports (LUMBAR BACK BRACE/SUPPORT PAD) misc Lumbar sacral corset brace   • glipiZIDE (GLUCOTROL) 10 MG tablet  Take 10 mg by mouth 2 (Two) Times a Day Before Meals.   • JANUVIA 100 MG tablet Take 100 mg by mouth Daily.   • JARDIANCE 10 MG tablet    • LANTUS SOLOSTAR 100 UNIT/ML injection pen    • meloxicam (MOBIC) 7.5 MG tablet TAKE 1 TABLET BY MOUTH DAILY WITH FOOD   • PARoxetine (PAXIL) 40 MG tablet Take 40 mg by mouth Every Morning.   • potassium chloride (K-DUR,KLOR-CON) 20 MEQ CR tablet Take 20 mEq by mouth Daily.   • pravastatin (PRAVACHOL) 40 MG tablet Take 40 mg by mouth Every Night.   • traMADol (ULTRAM) 50 MG tablet    • venlafaxine XR (EFFEXOR-XR) 75 MG 24 hr capsule Take 75 mg by mouth Daily.   • vitamin D (ERGOCALCIFEROL) 73326 UNITS capsule capsule Take 50,000 Units by mouth Every 7 (Seven) Days.     No current facility-administered medications on file prior to visit.      Current Medications:  Scheduled Meds:  PRN Meds:.    I have reviewed the patient's medical history in detail and updated the computerized patient record.  Review and summarization of old records include:    Past Medical History:   Diagnosis Date   • Anxiety    • Depression    • Diabetes mellitus (CMS/HCC)    • Hypertension    • Kidney stones    • Osteoarthritis    • Sleep apnea      Past Surgical History:   Procedure Laterality Date   • CHOLECYSTECTOMY     • HEEL SPUR SURGERY     • HERNIA REPAIR      hiatal hernia   • HYSTERECTOMY     • ROTATOR CUFF REPAIR       Social History     Occupational History   • Not on file   Tobacco Use   • Smoking status: Never Smoker   • Smokeless tobacco: Never Used   Substance and Sexual Activity   • Alcohol use: No   • Drug use: No   • Sexual activity: Defer      Social History     Social History Narrative   • Not on file     Family History   Problem Relation Age of Onset   • Hypertension Other    • Diabetes Other    • Cancer Other         colon cancer         Review of Systems   Constitutional: Negative.    HENT: Negative.    Eyes: Negative.    Respiratory: Negative.    Cardiovascular: Negative.   "  Gastrointestinal: Negative.    Endocrine: Negative.    Genitourinary: Negative.    Musculoskeletal: Positive for gait problem and joint swelling.   Skin: Negative.    Allergic/Immunologic: Negative.    Hematological: Negative.    Psychiatric/Behavioral: Negative.           Wt Readings from Last 3 Encounters:   05/02/19 133 kg (293 lb)   05/17/18 136 kg (299 lb)   08/02/17 136 kg (299 lb)     Ht Readings from Last 3 Encounters:   05/02/19 162.6 cm (64\")   05/17/18 165.1 cm (65\")   08/02/17 165.1 cm (65\")     Body mass index is 50.29 kg/m².  Facility age limit for growth percentiles is 20 years.  There were no vitals filed for this visit.      Physical Exam   Constitutional: Patient is oriented to person, place, and time. Appears well-developed and well-nourished.   HENT:   Head: Normocephalic and atraumatic.   Eyes: Conjunctivae and EOM are normal. Pupils are equal, round, and reactive to light.   Cardiovascular: Normal rate, regular rhythm, normal heart sounds and intact distal pulses.   Pulmonary/Chest: Effort normal and breath sounds normal.   Musculoskeletal:   See detailed exam below   Neurological: Alert and oriented to person, place, and time. No sensory deficit. Coordination normal.   Skin: Skin is warm and dry. Capillary refill takes less than 2 seconds. No rash noted. No erythema.   Psychiatric: Patient has a normal mood and affect. Her behavior is normal. Judgment and thought content normal.   Nursing note and vitals reviewed.    Ortho Exam:   Bilateral knee (varus). Patient has crepitus throughout range of motion. Positive patellar grind test. Mild effusion. Lachman is negative. Pivot shift is negative. Anterior and posterior drawer signs are negative. Significant joint line tenderness is noted on the medial aspect of the knee. Patient has a varus orientation of the knee. There is fullness and tenderness in the Popliteal fossa. Mild distention of a Popliteal cyst is noted in this location. Range of " motion in flexion is from 0-90 degrees. Neurovascular status is intact.  Dorsalis pedis and posterior tibial artery pulses are palpable. Common peroneal nerve function is well preserved. Patient's gait is cautious and antalgic. Skin and soft tissues are mildly swollen, consistent with synovitis and effusion. The patient has a significant limp with the first few steps after starting the gait cycle. Getting out of a chair takes a lot of effort due to pain on knee flexion.         Diagnostics:       Assessment:  Letha was seen today for follow-up.    Diagnoses and all orders for this visit:    Primary osteoarthritis of right knee    Morbidly obese (CMS/HCC)    Primary osteoarthritis of left knee          Procedures  ?    Plan    · Compression/brace  · Rest, ice, compression, and elevation (RICE) therapy  · Stretching and strengthening exercises.  · Patient needs to lose weight and we have discussed about calorie counts, decrease carbohydrate intake, increased exercise daily, consultation with a nutritional specialist and eventually a bariatric surgical intervention.  · OTC Tylenol 1000mg by mouth every 4-6 hours as needed for pain   · Follow up in 6 month(s)    Wong Arrieta MD  05/02/2019

## 2019-05-11 PROBLEM — E66.01 MORBIDLY OBESE (HCC): Status: ACTIVE | Noted: 2019-05-11

## 2019-09-26 ENCOUNTER — OFFICE VISIT (OUTPATIENT)
Dept: ORTHOPEDIC SURGERY | Facility: CLINIC | Age: 64
End: 2019-09-26

## 2019-09-26 VITALS — WEIGHT: 293 LBS | TEMPERATURE: 97.9 F | BODY MASS INDEX: 50.02 KG/M2 | HEIGHT: 64 IN

## 2019-09-26 DIAGNOSIS — G89.29 CHRONIC PAIN OF RIGHT KNEE: Primary | ICD-10-CM

## 2019-09-26 DIAGNOSIS — M25.561 CHRONIC PAIN OF RIGHT KNEE: Primary | ICD-10-CM

## 2019-09-26 DIAGNOSIS — M25.552 LEFT HIP PAIN: ICD-10-CM

## 2019-09-26 DIAGNOSIS — M17.11 PRIMARY OSTEOARTHRITIS OF RIGHT KNEE: ICD-10-CM

## 2019-09-26 PROCEDURE — 72170 X-RAY EXAM OF PELVIS: CPT | Performed by: ORTHOPAEDIC SURGERY

## 2019-09-26 PROCEDURE — 73560 X-RAY EXAM OF KNEE 1 OR 2: CPT | Performed by: ORTHOPAEDIC SURGERY

## 2019-09-26 PROCEDURE — 99213 OFFICE O/P EST LOW 20 MIN: CPT | Performed by: ORTHOPAEDIC SURGERY

## 2019-09-26 RX ORDER — HYDROCODONE BITARTRATE AND ACETAMINOPHEN 7.5; 325 MG/1; MG/1
TABLET ORAL
Refills: 0 | COMMUNITY
Start: 2019-09-23

## 2019-09-26 RX ORDER — GLIPIZIDE 10 MG/1
10 TABLET, FILM COATED, EXTENDED RELEASE ORAL 2 TIMES DAILY
Refills: 0 | COMMUNITY
Start: 2019-09-05

## 2019-09-26 RX ORDER — BUPROPION HYDROCHLORIDE 150 MG/1
150 TABLET ORAL EVERY MORNING
Refills: 2 | COMMUNITY
Start: 2019-09-05

## 2019-09-26 RX ORDER — KETOPROFEN 25 MG/1
CAPSULE ORAL
Refills: 1 | COMMUNITY
Start: 2019-08-22

## 2019-09-26 RX ORDER — CETIRIZINE HYDROCHLORIDE 10 MG/1
10 TABLET ORAL DAILY
Refills: 3 | COMMUNITY
Start: 2019-09-03

## 2019-09-26 RX ORDER — CALCIUM CITRATE/VITAMIN D3 200MG-6.25
TABLET ORAL
Refills: 3 | COMMUNITY
Start: 2019-09-03

## 2019-09-26 RX ORDER — AMLODIPINE BESYLATE 5 MG/1
5 TABLET ORAL DAILY
Refills: 2 | COMMUNITY
Start: 2019-09-05

## 2019-09-26 RX ORDER — VENLAFAXINE HYDROCHLORIDE 150 MG/1
150 CAPSULE, EXTENDED RELEASE ORAL DAILY
Refills: 2 | COMMUNITY
Start: 2019-09-05

## 2019-09-26 RX ORDER — BLOOD-GLUCOSE METER
EACH MISCELLANEOUS SEE ADMIN INSTRUCTIONS
Refills: 3 | COMMUNITY
Start: 2019-07-24

## 2019-09-26 RX ORDER — FUROSEMIDE 40 MG/1
40 TABLET ORAL DAILY
Refills: 2 | COMMUNITY
Start: 2019-09-05

## 2019-09-26 RX ORDER — POTASSIUM CHLORIDE 750 MG/1
20 TABLET, FILM COATED, EXTENDED RELEASE ORAL DAILY
Refills: 2 | COMMUNITY
Start: 2019-09-23

## 2019-11-21 ENCOUNTER — TELEPHONE (OUTPATIENT)
Dept: ORTHOPEDIC SURGERY | Facility: CLINIC | Age: 64
End: 2019-11-21

## 2019-11-21 NOTE — TELEPHONE ENCOUNTER
"Patient called to state she \"changed her mind & would like a referral to a weight-loss specialist, a doctor who does the lap-band surgery\" to lose weight prior to having Right knee surgery as discussed with IN[facility] at 09/26/19 appt. Patient can be reached at [phone]. Thanks /srh   "

## 2019-11-22 NOTE — TELEPHONE ENCOUNTER
That is wonderful.  Please make an appointment for the patient to see Dr. José Antonio Uriarte for bariatric intervention ASAP.  Thank you

## 2019-11-25 NOTE — TELEPHONE ENCOUNTER
WE CANNOT MAKE REFERRALS FOR THIS. FOR THE PAST 4 YEARS NOW THEY HAVE THE PATIENT ATTEND A SEMINAR FIRST BEFORE ANY SURGERY IS SCHEDULED. I WILL CALL THE PATIENT TO GIVE THEM DR. STEIN'S NUMBER/RJ

## 2020-09-16 ENCOUNTER — TELEPHONE (OUTPATIENT)
Dept: ORTHOPEDIC SURGERY | Facility: CLINIC | Age: 65
End: 2020-09-16

## 2020-09-16 DIAGNOSIS — M17.11 PRIMARY OSTEOARTHRITIS OF RIGHT KNEE: Primary | ICD-10-CM

## 2020-09-16 NOTE — TELEPHONE ENCOUNTER
PATIENT CALLED AND IS REQUESTING A NEW ORDER FOR A KNEE BRACE FOR HER RIGHT KNEE.  SHE WAS LAST SEEN ON 5/02/19 FOR HER RIGHT KNEE AND THE BRACE SHE WAS GIVEN DOES NOT FIT WELL.  SHE HAS BEEN TO [facility] AND HAS TRIED ON A KNEE BRACE THERE THAT SHE BELIEVES WILL WORK WELL FOR HER.  THEY TOLD HER IF IT HAS BEEN OVER A YEAR SINCE SHE GOT HER BRACE THAT HER INSURANCE SHOULD COVER A NEW ON FOR HER.  IF AUTHORIZED, PLEASE FAX ORDER TO [facility] -[phone]